# Patient Record
Sex: MALE
[De-identification: names, ages, dates, MRNs, and addresses within clinical notes are randomized per-mention and may not be internally consistent; named-entity substitution may affect disease eponyms.]

---

## 2017-05-31 ENCOUNTER — RECORDS - HEALTHEAST (OUTPATIENT)
Dept: ADMINISTRATIVE | Facility: OTHER | Age: 27
End: 2017-05-31

## 2019-10-08 ENCOUNTER — ANCILLARY PROCEDURE (OUTPATIENT)
Dept: GENERAL RADIOLOGY | Facility: CLINIC | Age: 29
End: 2019-10-08
Attending: FAMILY MEDICINE

## 2019-10-08 ENCOUNTER — OFFICE VISIT (OUTPATIENT)
Dept: URGENT CARE | Facility: URGENT CARE | Age: 29
End: 2019-10-08

## 2019-10-08 VITALS
DIASTOLIC BLOOD PRESSURE: 65 MMHG | SYSTOLIC BLOOD PRESSURE: 113 MMHG | HEART RATE: 59 BPM | OXYGEN SATURATION: 98 % | HEIGHT: 71 IN | BODY MASS INDEX: 25.31 KG/M2 | WEIGHT: 180.8 LBS

## 2019-10-08 DIAGNOSIS — K04.7 DENTAL ABSCESS: Primary | ICD-10-CM

## 2019-10-08 DIAGNOSIS — T14.8XXA ABRASION: ICD-10-CM

## 2019-10-08 DIAGNOSIS — S39.92XA BACK INJURY, INITIAL ENCOUNTER: ICD-10-CM

## 2019-10-08 PROCEDURE — 99203 OFFICE O/P NEW LOW 30 MIN: CPT | Performed by: FAMILY MEDICINE

## 2019-10-08 PROCEDURE — 72070 X-RAY EXAM THORAC SPINE 2VWS: CPT

## 2019-10-08 RX ORDER — ALBUTEROL SULFATE 90 UG/1
2 AEROSOL, METERED RESPIRATORY (INHALATION) EVERY 6 HOURS
COMMUNITY

## 2019-10-08 RX ORDER — AMOXICILLIN 875 MG
875 TABLET ORAL 2 TIMES DAILY
Qty: 20 TABLET | Refills: 0 | Status: SHIPPED | OUTPATIENT
Start: 2019-10-08 | End: 2019-10-24

## 2019-10-08 RX ORDER — SILVER SULFADIAZINE 10 MG/G
CREAM TOPICAL 2 TIMES DAILY
Qty: 25 G | Refills: 0 | Status: SHIPPED | OUTPATIENT
Start: 2019-10-08 | End: 2019-10-24

## 2019-10-08 ASSESSMENT — MIFFLIN-ST. JEOR: SCORE: 1809.29

## 2019-10-08 NOTE — PROGRESS NOTES
"SUBJECTIVE: Christiano Tinajero is a 28 year old male presenting with a chief complaint of fall, hitting back with abrasions and painful left upper tooth.  Onset of symptoms was day(s) ago.  Course of illness is same.    Severity moderate  Current and Associated symptoms: none  Treatment measures tried include otc.  Predisposing factors include dental carries.    No past medical history on file.  No Known Allergies  Social History     Tobacco Use     Smoking status: Current Every Day Smoker     Packs/day: 0.00     Types: Cigarettes     Smokeless tobacco: Never Used   Substance Use Topics     Alcohol use: Not on file       ROS:  SKIN: no rash  GI: no vomiting    OBJECTIVE:  /65   Pulse 59   Ht 1.791 m (5' 10.5\")   Wt 82 kg (180 lb 12.8 oz)   SpO2 98%   BMI 25.58 kg/m  GENERAL APPEARANCE: healthy, alert and no distress  EYES: EOMI,  PERRL, conjunctiva clear  HENT: oral mucous membranes moist, no erythema noted and left upper tooth pain  NECK: supple, nontender, no lymphadenopathy  RESP: lungs clear to auscultation - no rales, rhonchi or wheezes  CV: regular rates and rhythm, normal S1 S2, no murmur noted  ABDOMEN:  soft, nontender, no HSM or masses and bowel sounds normal  NEURO: Normal strength and tone, sensory exam grossly normal,  normal speech and mentation  SKIN: abrasions back, hip and hands    Xray without acute findings, no fx read by Rambo Hernández D.O.      ICD-10-CM    1. Dental abscess K04.7 amoxicillin (AMOXIL) 875 MG tablet   2. Back injury, initial encounter S39.92XA XR Thoracic Spine 2 Views   3. Abrasion T14.8XXA XR Thoracic Spine 2 Views     silver sulfADIAZINE (SILVADENE) 1 % external cream     CANCELED: TDAP, IM (10 - 64 YRS) - Adacel     Pt states TD 3 years ago  OTC pain meds  Fluids/Rest, f/u if worse/not any better    "

## 2019-10-24 ENCOUNTER — OFFICE VISIT (OUTPATIENT)
Dept: URGENT CARE | Facility: URGENT CARE | Age: 29
End: 2019-10-24

## 2019-10-24 VITALS
HEART RATE: 62 BPM | SYSTOLIC BLOOD PRESSURE: 122 MMHG | WEIGHT: 178.7 LBS | DIASTOLIC BLOOD PRESSURE: 80 MMHG | BODY MASS INDEX: 25.28 KG/M2 | TEMPERATURE: 97.9 F | OXYGEN SATURATION: 100 %

## 2019-10-24 DIAGNOSIS — K08.89 DENTALGIA: Primary | ICD-10-CM

## 2019-10-24 DIAGNOSIS — M26.609 TMJ (TEMPOROMANDIBULAR JOINT SYNDROME): ICD-10-CM

## 2019-10-24 PROCEDURE — 99213 OFFICE O/P EST LOW 20 MIN: CPT | Performed by: PHYSICIAN ASSISTANT

## 2019-10-24 RX ORDER — IBUPROFEN 200 MG
600 TABLET ORAL ONCE
Status: COMPLETED | OUTPATIENT
Start: 2019-10-24 | End: 2019-10-24

## 2019-10-24 RX ORDER — IBUPROFEN 800 MG/1
800 TABLET, FILM COATED ORAL EVERY 8 HOURS PRN
Qty: 60 TABLET | Refills: 0 | Status: SHIPPED | OUTPATIENT
Start: 2019-10-24

## 2019-10-24 RX ORDER — AMOXICILLIN 500 MG/1
500 CAPSULE ORAL 2 TIMES DAILY
Qty: 20 CAPSULE | Refills: 0 | Status: SHIPPED | OUTPATIENT
Start: 2019-10-24 | End: 2019-11-03

## 2019-10-24 RX ADMIN — Medication 600 MG: at 09:51

## 2019-10-24 ASSESSMENT — ENCOUNTER SYMPTOMS
VOMITING: 0
FEVER: 0
SHORTNESS OF BREATH: 0

## 2019-10-24 NOTE — PROGRESS NOTES
HPI  October 24, 2019    HPI: Christiano Tinajero is a 28 year old male who complains of diffuse dental pain onset several months ago. He reports pain is constant but worse with eating, opening his mouth wide, or when cold air hits his mouth. He also notes a localized pain to L jaw area especially when biting. He reports he does have a history of grinding his teeth & clenching his jaw- used to have a mouthguard he wore at night but doesn't know where it is. He states he hasn't been to a dentist in years. Patient was seen here about 2 weeks ago for dental pain and given Rx for amoxicillin but didn't fill it. Symptoms are moderate in severity & constant in duration. Denies HA, sore throat, facial or throat swelling, difficulty swallowing, drooling, CP, SOB, abd pain, N/V, and any other symptoms.    No past medical history on file.  No past surgical history on file.  Social History     Tobacco Use     Smoking status: Current Every Day Smoker     Packs/day: 0.00     Types: Cigarettes     Smokeless tobacco: Never Used   Substance Use Topics     Alcohol use: None     Drug use: None       Problem list, Medication list, Allergies, and Medical/Social/Surgical histories reviewed in [facility] and updated as appropriate.      Review of Systems   Constitutional: Negative for fever.   HENT:        Dental pain    No drooling or facial swelling   Respiratory: Negative for shortness of breath.    Cardiovascular: Negative for chest pain.   Gastrointestinal: Negative for vomiting.   All other systems reviewed and are negative.        Physical Exam  Vitals signs and nursing note reviewed.   HENT:      Head: Normocephalic and atraumatic.      Jaw: There is normal jaw occlusion. Tenderness and pain on movement (at left TMJ) present. No trismus or swelling.        Right Ear: External ear normal.      Left Ear: External ear normal.      Nose: Nose normal.      Mouth/Throat:      Dentition: Abnormal dentition. Dental caries present. No dental  abscesses.      Pharynx: Uvula midline.   Cardiovascular:      Rate and Rhythm: Normal rate and regular rhythm.   Pulmonary:      Effort: Pulmonary effort is normal.   Lymphadenopathy:      Head:      Right side of head: No submandibular or tonsillar adenopathy.      Left side of head: No submandibular or tonsillar adenopathy.      Cervical: No cervical adenopathy.   Neurological:      Mental Status: He is oriented to person, place, and time.       Vital Signs  /80   Pulse 62   Temp 97.9  F (36.6  C) (Tympanic)   Wt 81.1 kg (178 lb 11.2 oz)   SpO2 100%   BMI 25.28 kg/m       Diagnostic Test Results:  none     ASSESSMENT/PLAN      ICD-10-CM    1. Dentalgia K08.89 ibuprofen (ADVIL/MOTRIN) tablet 600 mg     amoxicillin (AMOXIL) 500 MG capsule     ibuprofen (ADVIL/MOTRIN) 800 MG tablet   2. TMJ (temporomandibular joint syndrome) M26.609       No dental abscess or s/sx Manish's angina. Pt with dental caries. Stressed importance of seeing a dentist, but will Rx amoxicillin today in case of dental infection.   Suspect TMJ syndrome on the left- advised avoiding chewing gum and clenching jaw. May use warm compresses & ibuprofen PRN. Pt given ibuprofen here in clinic.      I have discussed any lab or imaging results, the patient's diagnosis, and my plan of treatment with the patient and/or family. Patient is aware to come back in if with worsening symptoms or if no relief despite treatment plan.  Patient voiced understanding and had no further questions.       Follow Up: Return in about 3 days (around 10/27/2019) for follow up with dentist.    RAIMUNDO Cardona, PAPremaC  [facility]

## 2019-10-24 NOTE — LETTER
[facility]  [address] [address]  Phone: [phone]    October 24, 2019        Christiano Tinajero  [address]  [place] [address]          To whom it may concern:    RE: Christiano Tinajero    Patient was seen and treated today at our clinic and missed work.    Please contact me for questions or concerns.      Sincerely,        Faith Barrera PA-C

## 2021-05-30 VITALS — HEIGHT: 70 IN

## 2021-05-30 VITALS — BODY MASS INDEX: 24.74 KG/M2 | WEIGHT: 170 LBS
